# Patient Record
Sex: FEMALE | URBAN - METROPOLITAN AREA
[De-identification: names, ages, dates, MRNs, and addresses within clinical notes are randomized per-mention and may not be internally consistent; named-entity substitution may affect disease eponyms.]

---

## 2017-06-13 PROBLEM — M54.50 CHRONIC LEFT-SIDED LOW BACK PAIN WITHOUT SCIATICA: Status: ACTIVE | Noted: 2017-06-13

## 2017-06-13 PROBLEM — G47.30 SLEEP APNEA, UNSPECIFIED TYPE: Status: ACTIVE | Noted: 2017-06-13

## 2017-06-13 PROBLEM — R26.81 UNSTEADINESS ON FEET: Status: ACTIVE | Noted: 2017-06-13

## 2017-06-13 PROBLEM — G89.29 CHRONIC LEFT-SIDED LOW BACK PAIN WITHOUT SCIATICA: Status: ACTIVE | Noted: 2017-06-13

## 2017-06-13 PROBLEM — G40.909 SEIZURE DISORDER (HCC): Status: ACTIVE | Noted: 2017-06-13

## 2017-06-13 PROBLEM — S06.9X9D: Status: ACTIVE | Noted: 2017-06-13

## 2017-06-13 PROCEDURE — 82607 VITAMIN B-12: CPT | Performed by: FAMILY MEDICINE

## 2017-06-13 PROCEDURE — 82746 ASSAY OF FOLIC ACID SERUM: CPT | Performed by: FAMILY MEDICINE

## 2017-09-07 PROBLEM — E66.01 SEVERE OBESITY (BMI 35.0-39.9) WITH COMORBIDITY (HCC): Status: ACTIVE | Noted: 2017-09-07

## 2017-12-12 PROBLEM — E66.01 SEVERE OBESITY (BMI 35.0-39.9) WITH COMORBIDITY (HCC): Status: RESOLVED | Noted: 2017-09-07 | Resolved: 2017-12-12

## 2017-12-21 PROCEDURE — 80201 ASSAY OF TOPIRAMATE: CPT | Performed by: OTHER

## 2018-04-12 PROBLEM — H61.23 BILATERAL IMPACTED CERUMEN: Status: ACTIVE | Noted: 2018-04-12

## 2018-04-12 PROBLEM — H25.9 AGE-RELATED CATARACT OF BOTH EYES, UNSPECIFIED AGE-RELATED CATARACT TYPE: Status: ACTIVE | Noted: 2018-04-12

## 2018-04-12 PROCEDURE — 82746 ASSAY OF FOLIC ACID SERUM: CPT | Performed by: FAMILY MEDICINE

## 2018-04-12 PROCEDURE — 82607 VITAMIN B-12: CPT | Performed by: FAMILY MEDICINE

## 2018-07-12 PROBLEM — E66.01 OBESITY, MORBID, BMI 40.0-49.9 (HCC): Status: ACTIVE | Noted: 2018-07-12

## 2018-07-12 PROBLEM — H53.9 VISION ABNORMALITIES: Status: ACTIVE | Noted: 2018-07-12

## 2018-07-12 PROCEDURE — 80201 ASSAY OF TOPIRAMATE: CPT | Performed by: FAMILY MEDICINE

## 2018-07-12 PROCEDURE — 36415 COLL VENOUS BLD VENIPUNCTURE: CPT | Performed by: FAMILY MEDICINE

## 2019-04-15 PROBLEM — S06.9X9A BRAIN INJURY WITH LOSS OF CONSCIOUSNESS (HCC): Status: ACTIVE | Noted: 2017-06-13

## 2019-04-15 PROBLEM — H61.23 BILATERAL IMPACTED CERUMEN: Status: RESOLVED | Noted: 2018-04-12 | Resolved: 2019-04-15

## 2019-04-15 PROCEDURE — 81001 URINALYSIS AUTO W/SCOPE: CPT | Performed by: FAMILY MEDICINE

## 2019-04-15 PROCEDURE — 87086 URINE CULTURE/COLONY COUNT: CPT | Performed by: FAMILY MEDICINE

## 2019-04-15 PROCEDURE — 80201 ASSAY OF TOPIRAMATE: CPT | Performed by: FAMILY MEDICINE

## 2023-02-13 ENCOUNTER — TELEPHONE (OUTPATIENT)
Dept: MAMMOGRAPHY | Facility: HOSPITAL | Age: 63
End: 2023-02-13

## 2023-02-14 ENCOUNTER — TELEPHONE (OUTPATIENT)
Dept: GENERAL RADIOLOGY | Facility: HOSPITAL | Age: 63
End: 2023-02-14

## 2023-03-23 ENCOUNTER — HOSPITAL ENCOUNTER (OUTPATIENT)
Dept: MAMMOGRAPHY | Facility: HOSPITAL | Age: 63
Discharge: HOME | End: 2023-03-23
Attending: SURGERY
Payer: MEDICARE

## 2023-03-23 ENCOUNTER — ANESTHESIA (OUTPATIENT)
Dept: SURGERY | Facility: HOSPITAL | Age: 63
End: 2023-03-23
Payer: MEDICARE

## 2023-03-23 ENCOUNTER — ANESTHESIA EVENT (OUTPATIENT)
Dept: SURGERY | Facility: HOSPITAL | Age: 63
End: 2023-03-23
Payer: MEDICARE

## 2023-03-23 ENCOUNTER — HOSPITAL ENCOUNTER (OUTPATIENT)
Facility: HOSPITAL | Age: 63
Setting detail: HOSPITAL OUTPATIENT SURGERY
Discharge: HOME | End: 2023-03-23
Attending: SURGERY | Admitting: SURGERY
Payer: MEDICARE

## 2023-03-23 VITALS
WEIGHT: 211 LBS | HEIGHT: 61 IN | SYSTOLIC BLOOD PRESSURE: 111 MMHG | DIASTOLIC BLOOD PRESSURE: 61 MMHG | OXYGEN SATURATION: 99 % | BODY MASS INDEX: 39.84 KG/M2 | RESPIRATION RATE: 14 BRPM | TEMPERATURE: 99 F | HEART RATE: 67 BPM

## 2023-03-23 DIAGNOSIS — D24.1 PAPILLOMA OF RIGHT BREAST: ICD-10-CM

## 2023-03-23 PROCEDURE — 0HBT0ZZ EXCISION OF RIGHT BREAST, OPEN APPROACH: ICD-10-PCS | Performed by: SURGERY

## 2023-03-23 PROCEDURE — 76098 X-RAY EXAM SURGICAL SPECIMEN: CPT | Performed by: SURGERY

## 2023-03-23 PROCEDURE — 88307 TISSUE EXAM BY PATHOLOGIST: CPT | Performed by: SURGERY

## 2023-03-23 PROCEDURE — 19281 PERQ DEVICE BREAST 1ST IMAG: CPT | Performed by: SURGERY

## 2023-03-23 RX ORDER — HYDROMORPHONE HYDROCHLORIDE 1 MG/ML
0.6 INJECTION, SOLUTION INTRAMUSCULAR; INTRAVENOUS; SUBCUTANEOUS EVERY 5 MIN PRN
Status: DISCONTINUED | OUTPATIENT
Start: 2023-03-23 | End: 2023-03-23

## 2023-03-23 RX ORDER — HYDROCODONE BITARTRATE AND ACETAMINOPHEN 5; 325 MG/1; MG/1
1 TABLET ORAL ONCE AS NEEDED
Status: DISCONTINUED | OUTPATIENT
Start: 2023-03-23 | End: 2023-03-23

## 2023-03-23 RX ORDER — ONDANSETRON 2 MG/ML
4 INJECTION INTRAMUSCULAR; INTRAVENOUS EVERY 6 HOURS PRN
Status: DISCONTINUED | OUTPATIENT
Start: 2023-03-23 | End: 2023-03-23

## 2023-03-23 RX ORDER — LIDOCAINE HYDROCHLORIDE 10 MG/ML
INJECTION, SOLUTION INFILTRATION; PERINEURAL AS NEEDED
Status: DISCONTINUED | OUTPATIENT
Start: 2023-03-23 | End: 2023-03-23 | Stop reason: HOSPADM

## 2023-03-23 RX ORDER — CEFAZOLIN SODIUM/WATER 2 G/20 ML
2 SYRINGE (ML) INTRAVENOUS ONCE
Status: COMPLETED | OUTPATIENT
Start: 2023-03-23 | End: 2023-03-23

## 2023-03-23 RX ORDER — ACETAMINOPHEN 500 MG
1000 TABLET ORAL ONCE AS NEEDED
Status: DISCONTINUED | OUTPATIENT
Start: 2023-03-23 | End: 2023-03-23

## 2023-03-23 RX ORDER — DEXAMETHASONE SODIUM PHOSPHATE 4 MG/ML
VIAL (ML) INJECTION AS NEEDED
Status: DISCONTINUED | OUTPATIENT
Start: 2023-03-23 | End: 2023-03-23 | Stop reason: SURG

## 2023-03-23 RX ORDER — SODIUM CHLORIDE, SODIUM LACTATE, POTASSIUM CHLORIDE, CALCIUM CHLORIDE 600; 310; 30; 20 MG/100ML; MG/100ML; MG/100ML; MG/100ML
INJECTION, SOLUTION INTRAVENOUS CONTINUOUS
Status: DISCONTINUED | OUTPATIENT
Start: 2023-03-23 | End: 2023-03-23

## 2023-03-23 RX ORDER — SCOLOPAMINE TRANSDERMAL SYSTEM 1 MG/1
1 PATCH, EXTENDED RELEASE TRANSDERMAL ONCE
Status: DISCONTINUED | OUTPATIENT
Start: 2023-03-23 | End: 2023-03-23 | Stop reason: HOSPADM

## 2023-03-23 RX ORDER — NALOXONE HYDROCHLORIDE 0.4 MG/ML
80 INJECTION, SOLUTION INTRAMUSCULAR; INTRAVENOUS; SUBCUTANEOUS AS NEEDED
Status: DISCONTINUED | OUTPATIENT
Start: 2023-03-23 | End: 2023-03-23

## 2023-03-23 RX ORDER — HYDROCODONE BITARTRATE AND ACETAMINOPHEN 5; 325 MG/1; MG/1
2 TABLET ORAL ONCE AS NEEDED
Status: DISCONTINUED | OUTPATIENT
Start: 2023-03-23 | End: 2023-03-23

## 2023-03-23 RX ORDER — DIAZEPAM 5 MG/1
5 TABLET ORAL AS NEEDED
Status: DISCONTINUED | OUTPATIENT
Start: 2023-03-23 | End: 2023-03-23 | Stop reason: HOSPADM

## 2023-03-23 RX ORDER — HYDROMORPHONE HYDROCHLORIDE 1 MG/ML
0.2 INJECTION, SOLUTION INTRAMUSCULAR; INTRAVENOUS; SUBCUTANEOUS EVERY 5 MIN PRN
Status: DISCONTINUED | OUTPATIENT
Start: 2023-03-23 | End: 2023-03-23

## 2023-03-23 RX ORDER — HEPARIN SODIUM 5000 [USP'U]/ML
5000 INJECTION, SOLUTION INTRAVENOUS; SUBCUTANEOUS ONCE
Status: COMPLETED | OUTPATIENT
Start: 2023-03-23 | End: 2023-03-23

## 2023-03-23 RX ORDER — ACETAMINOPHEN 500 MG
1000 TABLET ORAL ONCE
Status: DISCONTINUED | OUTPATIENT
Start: 2023-03-23 | End: 2023-03-23 | Stop reason: HOSPADM

## 2023-03-23 RX ORDER — HYDROMORPHONE HYDROCHLORIDE 1 MG/ML
0.4 INJECTION, SOLUTION INTRAMUSCULAR; INTRAVENOUS; SUBCUTANEOUS EVERY 5 MIN PRN
Status: DISCONTINUED | OUTPATIENT
Start: 2023-03-23 | End: 2023-03-23

## 2023-03-23 RX ORDER — BUPIVACAINE HYDROCHLORIDE 2.5 MG/ML
INJECTION, SOLUTION EPIDURAL; INFILTRATION; INTRACAUDAL AS NEEDED
Status: DISCONTINUED | OUTPATIENT
Start: 2023-03-23 | End: 2023-03-23 | Stop reason: HOSPADM

## 2023-03-23 RX ORDER — PROCHLORPERAZINE EDISYLATE 5 MG/ML
5 INJECTION INTRAMUSCULAR; INTRAVENOUS EVERY 8 HOURS PRN
Status: DISCONTINUED | OUTPATIENT
Start: 2023-03-23 | End: 2023-03-23

## 2023-03-23 RX ORDER — ONDANSETRON 2 MG/ML
INJECTION INTRAMUSCULAR; INTRAVENOUS AS NEEDED
Status: DISCONTINUED | OUTPATIENT
Start: 2023-03-23 | End: 2023-03-23 | Stop reason: SURG

## 2023-03-23 RX ORDER — LIDOCAINE HYDROCHLORIDE 10 MG/ML
INJECTION, SOLUTION EPIDURAL; INFILTRATION; INTRACAUDAL; PERINEURAL AS NEEDED
Status: DISCONTINUED | OUTPATIENT
Start: 2023-03-23 | End: 2023-03-23 | Stop reason: SURG

## 2023-03-23 RX ADMIN — DEXAMETHASONE SODIUM PHOSPHATE 8 MG: 4 MG/ML VIAL (ML) INJECTION at 10:58:00

## 2023-03-23 RX ADMIN — LIDOCAINE HYDROCHLORIDE 50 MG: 10 INJECTION, SOLUTION EPIDURAL; INFILTRATION; INTRACAUDAL; PERINEURAL at 10:55:00

## 2023-03-23 RX ADMIN — CEFAZOLIN SODIUM/WATER 2 G: 2 G/20 ML SYRINGE (ML) INTRAVENOUS at 11:00:00

## 2023-03-23 RX ADMIN — SODIUM CHLORIDE, SODIUM LACTATE, POTASSIUM CHLORIDE, CALCIUM CHLORIDE: 600; 310; 30; 20 INJECTION, SOLUTION INTRAVENOUS at 10:51:00

## 2023-03-23 RX ADMIN — ONDANSETRON 4 MG: 2 INJECTION INTRAMUSCULAR; INTRAVENOUS at 11:19:00

## 2023-03-23 NOTE — BRIEF OP NOTE
Pre-Operative Diagnosis: right breast intraductal PAPILLOMA and abnormal imaging of the right breast     Post-Operative Diagnosis:  right breast intraductal PAPILLOMA and abnormal imaging of the right breast       Procedure Performed:   RIGHT BREAST NEEDLE LOCALIZATION LUMPECTOMY    Surgeon(s) and Role:     * Clarence Carrion MD - Primary    Assistant(s):   Zainab Yip  Assistant was medically necessary for patient positioning, suctioning and retraction of tissues     Surgical Findings: clip removed     Specimen: to pathology     Estimated Blood Loss: Chaka Garg MD  3/23/2023  11:27 AM

## 2023-03-23 NOTE — OPERATIVE REPORT
Pre-Operative Diagnosis: right breast intraductal PAPILLOMA and abnormal imaging of the right breast     Post-Operative Diagnosis:  right breast intraductal PAPILLOMA and abnormal imaging of the right breast       Procedure Performed:   RIGHT BREAST NEEDLE LOCALIZATION LUMPECTOMY    Surgeon(s) and Role:     * Netta Wallis MD - Primary    Assistant(s):   Risa Downs  Assistant was medically necessary for patient positioning, suctioning and retraction of tissues     Surgical Findings: clip removed     Specimen: to pathology     Estimated Blood Loss: Hany Araujo MD  3/23/2023

## 2023-03-23 NOTE — IMAGING NOTE
Rashida Cervantes arrived in the mammography department accompanied by her brother Fabio Flores. Fabio Flores shared that he is his sister's medical power of . Power of  document not available in  EMR at this time    Discussed with [facility] representative from department of risk management- procedure of POA carried out as recommended  Brenda Hilario- risk management- assisted with POA documents and served as witness. POA is signed by patient providing brother POA for healthcare. Rashida Cervantes identified with name and date of birth. Ms. Radha Mccoy oriented x 2. Identity confirmed- hospital ID band. Medications and allergies reviewed. NKDA reported. History: papilloma- right breast  Surgery: RIGHT BREAST NEEDLE LOCALIZATION LUMPECTOMY    Order verified. Procedure explained to Devonte Guerrero and her brother Darian Loredo. All questions answered. Rashida Cervantes and Fabio Flores verbalized understanding and agreement. 9154: Written consent obtained from both Whitneyilia Guerrero and Darian Loredo. 2245: Scans taken by STARR PACKER- mammography technologist    9241: Dr. Dheeraj Wild  present  Assisted  with mammography guided needle localization of the right breast.     6536: Time out complete. 0945: Site prepped in a sterile manner. 2979: Lidocaine administered for anesthetic affect. 6824: Tuloc 20G x 5cm needle placed- right breast  Additional images obtained. Emotional support provided. Maryann Bhardwaj tolerated procedure well. Site cleaned. Wire secured with pink clip, steri strips, sterile 4x4 gauze dressing, Tegaderm, and Transpore tape. Rashida Cervantes transported via wheelchair to pre-op/surgery holding in stable condition. Ms. Radha Mccoy without complaints or concerns at this time. Ms. Radha Mccoy accompanied by her brother Darian Loredo.

## 2023-03-23 NOTE — DISCHARGE INSTRUCTIONS
BREAST SURGERY POST-OPERATIVE INSTRUCTIONS   Lumpectomy, Mastectomy, Axillary Dissection        The following are some guidelines for you to help in your recovery after breast surgery. Please keep in mind that everyone recovers in different ways, so please call if you have any questions. General Guidelines  -Listen to your body and rest when you feel tired. -You should experience mild to moderate discomfort once the anesthetic has worn off.  -You will have a good support bra placed on you after surgery. It is a good idea to wear          this bra day and night for 2-3 days (or longer) after surgery. This helps immobilize the breast and will help alleviate discomfort. If the breast does not move very much, it will not hurt as much. Medications  -You will be given a prescription for a narcotic pain medication. Some patients will experience very little discomfort and will not require this narcotic. You may take Tylenol or extra-strength Tylenol (acetaminophen) instead. I would prefer that you AVOID aspirin, ibuprofen (Advil), Excedrin and Celebrex.  -The narcotic pain medication can cause constipation so it is important to keep well-hydrated by drinking plenty of fluids. You should not consume any alcohol while you are taking narcotics. If you are constipated, you may take an over-the counter stool softener (Colace). Activity  -You may resume most daily activities the day after surgery.  -For mastectomy patients, arm exercises can begin 2-3 days after surgery. It is important to start off slowly and gradually increase your range of motion. If you have a drain in place, it is important to be cautious and perform only very light exercise and minimize any significant amount of repetitive activity. Too much activity can increase the amount of time that the drain needs to stay in. If you have had breast reconstruction, you need to check with the plastic surgeon regarding when you can start the exercises.   -You can drive only when you are pain free, can react to an emergency situation, AND when you are not taking any narcotics. You should not lift anything over 5 pounds on the affected side or any other strenuous activity such as exercise until you have cleared it with me. Wound Care  -You should keep the dressing on the incision for AT LEAST 24 hours after surgery. After that time you may remove the dressing and shower. After your shower, pat the incision dry. Do not apply any creams or ointments. -You should not take tub baths or do any swimming.  -You will find steri-strips on the incision when you remove the dressings. Please do not remove the steri-strips-they will usually peel off within 10 days. If they have not come off by then, you may slowly peel them off. The incision is closed with absorbable sutures that are under the skin; on occasion, I may place a suture that may need to be removed in the office 10 days after surgery.    -You may experience some minor swelling and numbness along the incision. Drain care  -If you have had an axillary dissection or mastectomy, you will have drains placed at the time of surgery. You will go home with these drains as they usually are required for 5-10 days.  -You will need to empty the drain and strip the tubing 3-4 times per day. Stripping the drain prevents it from becoming clogged. You should measure and record the output of the drain every time you empty it. It is important to squeeze the bulb of the drain before closing the cap, so that a vacuum is produced for the drain to work. The drain is usually removed when the drain output is less than 30 cc over a 24 hour period for 2 days. Pathology Results  I will call you once I have received the pathology results- this usually takes 5 days. Follow-up appointment  Please call my office at [phone] to schedule a follow-up appointment for 7-10 days following your procedure.

## 2023-03-23 NOTE — ANESTHESIA PROCEDURE NOTES
Airway  Date/Time: 3/23/2023 10:57 AM  Urgency: Elective      General Information and Staff    Patient location during procedure: OR  Anesthesiologist: Katy Yates MD  Resident/CRNA: Sabina Valencia CRNA  Performed: CRNA   Performed by: Sabina Valencia CRNA  Authorized by: Katy Yates MD      Indications and Patient Condition  Indications for airway management: anesthesia  Sedation level: deep  Preoxygenated: yes  Patient position: sniffing  Mask difficulty assessment: 1 - vent by mask    Final Airway Details  Final airway type: supraglottic airway      Successful airway: classic  Size 3       Number of attempts at approach: 1    Additional Comments  Atraumatic insertion, dentition and lips as preop

## 2023-03-23 NOTE — OR PREOP
Patient has neurological deficit and Idania Almanza, Brother reports has mental capacity of 3year old; Idania Almanza reported patient is ok to sign own surgical consent with explanation; Patient did follow commands, verified name and date of birth, and physically signed own consent.

## 2023-03-23 NOTE — ANESTHESIA POSTPROCEDURE EVALUATION
[address] [facility],[address] Patient Status:  Hospital Outpatient Surgery   Age/Gender 60year old female MRN [id number]   [facility] SURGERY Attending Janusz Sutton MD   Hosp Day # 0 PCP Qi Alonso MD       Anesthesia Post-op Note    RIGHT BREAST NEEDLE LOCALIZATION LUMPECTOMY    Procedure Summary     Date: 03/23/23 Room / Location: [address] [place] [address] OR 17 / [address] [facility] OR    Anesthesia Start: 4042 Anesthesia Stop: 4233    Procedure: RIGHT BREAST NEEDLE LOCALIZATION LUMPECTOMY (Right: Breast) Diagnosis: (PAPILLOMA)    Surgeons: Janusz Sutton MD Anesthesiologist: Valentín Nj MD    Anesthesia Type: general ASA Status: 3          Anesthesia Type: general    Vitals Value Taken Time   /78 03/23/23 1138   Temp 97.8 03/23/23 1138   Pulse 71 03/23/23 1138   Resp 16 03/23/23 1138   SpO2 98 03/23/23 1138       Patient Location: PACU    Anesthesia Type: general    Airway Patency: patent    Postop Pain Control: adequate    Mental Status: mildly sedated but able to meaningfully participate in the post-anesthesia evaluation    Nausea/Vomiting: none    Cardiopulmonary/Hydration status: stable euvolemic    Complications: no apparent anesthesia related complications    Postop vital signs: stable    Dental Exam: Unchanged from Preop    Patient to be discharged from PACU when criteria met.

## 2023-03-24 NOTE — OPERATIVE REPORT
[facility]    PATIENT'S NAME: Pancho Patino PHYSICIAN: Rachael Solis M.D. OPERATING PHYSICIAN: Rachael Solis M.D. PATIENT ACCOUNT#:   [de-identified]    LOCATION:  [address] [place] [address]  MEDICAL RECORD #:   [id number]       YOB: 1960  ADMISSION DATE:       03/23/2023      OPERATION DATE:  03/23/2023    OPERATIVE REPORT      PREOPERATIVE DIAGNOSIS:  Right breast abnormal imaging and intraductal papilloma. POSTOPERATIVE DIAGNOSIS:  Right breast abnormal imaging and intraductal papilloma. PROCEDURE:  Right breast needle-localized lumpectomy. ASSISTANT:  LUIS ENRIQUE Capellan. Assistant was medically necessary for patient positioning, suctioning, and retraction of tissues. ANESTHESIA:  MAC.     INDICATIONS:  The patient is a 77-year-old lady diagnosed with right breast abnormal imaging and intraductal papilloma. I discussed the options of observation and excision with the patient and her brother in detail. Pros and cons of each option were discussed. They wished to proceed with excision. The patient's brother has medical health care power of . The surgery of right breast-needle localized lumpectomy was discussed with him in detail as well as potential complications, not limited to infection, bleeding, anesthetic risks, heart attack, stroke, and death. The possibility of requiring another surgery based on final pathology was also discussed. All their questions were answered to their satisfaction. They are agreeable to proceed. OPERATIVE TECHNIQUE:  Informed consent was obtained. She was taken to the operating room, SCDs placed, IV antibiotics given, IV sedation given. Right breast prepped and draped in usual sterile fashion. After infiltration with 1% lidocaine, I made a curvilinear incision in the right breast medial aspect. Incision made using scalpel and then carried down to the breast parenchyma.   Using electrocautery, dissected around the guidewire. In this fashion, the specimen was removed in 1 piece and tagged in the following manner:  Long stitch lateral, short stitch superficial.  I used the Faxitron to x-ray the specimen, and the clip was noted in there. I examined the lumpectomy cavity. No abnormal areas were palpated. The wound was irrigated, suctioned dry, and examined for bleeding. Hemostasis was meticulously maintained using electrocautery. Dermis reapproximated with interrupted 3-0 Vicryl suture, skin with 4-0 Vicryl subcuticular stitch. At the end of the procedure, the sponge and needle counts were correct. Patient tolerated the procedure well. EBL was 5 mL. Complications, none. The patient was taken to recovery room in stable condition, awake. I discussed my findings with the patient's brother, who accompanies her.     Dictated By Lulu Oseguera M.D.  d: 03/23/2023 11:31:39  t: 03/23/2023 18:51:59  Job [id number]  ALBERTO/

## (undated) DEVICE — TIP BOVIE 4" MEGADYNE

## (undated) DEVICE — SLEEVE KENDALL SCD EXPRESS MED

## (undated) DEVICE — STERILE POLYISOPRENE POWDER-FREE SURGICAL GLOVES: Brand: PROTEXIS

## (undated) DEVICE — UNDYED BRAIDED (POLYGLACTIN 910), SYNTHETIC ABSORBABLE SUTURE: Brand: COATED VICRYL

## (undated) DEVICE — PAD SACRAL PREMIUM 12X12X1

## (undated) DEVICE — VIOLET BRAIDED (POLYGLACTIN 910), SYNTHETIC ABSORBABLE SUTURE: Brand: COATED VICRYL

## (undated) DEVICE — SUT VICRYL 3-0 SH J416H

## (undated) DEVICE — LIGHT HANDLE

## (undated) DEVICE — REDUCER FITTING (NON-STERILE) 7/8 IN (22 MM) TO 1/4 IN (6.4 MM): Brand: CONMED BUFFALO FILTER

## (undated) DEVICE — APPLICATOR CHLORAPREP 10.5ML

## (undated) DEVICE — SHEET,DRAPE,40X58,STERILE: Brand: MEDLINE

## (undated) DEVICE — SOL NACL IRRIG 0.9% 1000ML BTL

## (undated) DEVICE — UNDERPAD 23X36 LIGHT CHUX

## (undated) DEVICE — GAUZE,SPONGE,FLUFF,6"X6.75",STRL,5/TRAY: Brand: MEDLINE

## (undated) DEVICE — PLASTIC BREAST CDS-LF: Brand: MEDLINE INDUSTRIES, INC.